# Patient Record
Sex: FEMALE | Race: WHITE | NOT HISPANIC OR LATINO | Employment: OTHER | URBAN - METROPOLITAN AREA
[De-identification: names, ages, dates, MRNs, and addresses within clinical notes are randomized per-mention and may not be internally consistent; named-entity substitution may affect disease eponyms.]

---

## 2024-01-15 ENCOUNTER — NURSING HOME VISIT (OUTPATIENT)
Dept: POST ACUTE CARE | Facility: EXTERNAL LOCATION | Age: 84
End: 2024-01-15
Payer: MEDICARE

## 2024-01-15 VITALS
HEART RATE: 70 BPM | RESPIRATION RATE: 16 BRPM | TEMPERATURE: 98.9 F | WEIGHT: 126.3 LBS | DIASTOLIC BLOOD PRESSURE: 68 MMHG | SYSTOLIC BLOOD PRESSURE: 122 MMHG | OXYGEN SATURATION: 98 %

## 2024-01-15 DIAGNOSIS — S22.089D CLOSED FRACTURE OF ELEVENTH THORACIC VERTEBRA WITH ROUTINE HEALING, UNSPECIFIED FRACTURE MORPHOLOGY, SUBSEQUENT ENCOUNTER: Primary | ICD-10-CM

## 2024-01-15 DIAGNOSIS — K21.9 GASTROESOPHAGEAL REFLUX DISEASE WITHOUT ESOPHAGITIS: ICD-10-CM

## 2024-01-15 DIAGNOSIS — E78.5 HYPERLIPIDEMIA, UNSPECIFIED HYPERLIPIDEMIA TYPE: ICD-10-CM

## 2024-01-15 DIAGNOSIS — F41.9 ANXIETY: ICD-10-CM

## 2024-01-15 DIAGNOSIS — N30.00 ACUTE CYSTITIS WITHOUT HEMATURIA: ICD-10-CM

## 2024-01-15 DIAGNOSIS — F01.54 VASCULAR DEMENTIA WITH ANXIETY, UNSPECIFIED DEMENTIA SEVERITY (MULTI): ICD-10-CM

## 2024-01-15 DIAGNOSIS — I10 HYPERTENSION, UNSPECIFIED TYPE: ICD-10-CM

## 2024-01-15 DIAGNOSIS — R11.0 NAUSEA: ICD-10-CM

## 2024-01-15 DIAGNOSIS — Z91.81 HX OF FALL: ICD-10-CM

## 2024-01-15 DIAGNOSIS — E55.9 VITAMIN D DEFICIENCY: ICD-10-CM

## 2024-01-15 DIAGNOSIS — Z85.3 HX: BREAST CANCER: ICD-10-CM

## 2024-01-15 PROBLEM — F01.50 VASCULAR DEMENTIA (MULTI): Status: ACTIVE | Noted: 2024-01-15

## 2024-01-15 PROBLEM — S22.089A: Status: ACTIVE | Noted: 2024-01-15

## 2024-01-15 PROBLEM — Z87.898 HX OF SYNCOPE: Status: ACTIVE | Noted: 2024-01-15

## 2024-01-15 PROBLEM — N39.0 UTI (URINARY TRACT INFECTION): Status: ACTIVE | Noted: 2024-01-15

## 2024-01-15 PROCEDURE — G0317 PROLONG NURSING FAC EVAL 15M: HCPCS | Performed by: NURSE PRACTITIONER

## 2024-01-15 PROCEDURE — 99310 SBSQ NF CARE HIGH MDM 45: CPT | Performed by: NURSE PRACTITIONER

## 2024-01-15 ASSESSMENT — ENCOUNTER SYMPTOMS
CHILLS: 0
PALPITATIONS: 1
DIARRHEA: 0
FATIGUE: 1
FEVER: 0
WEAKNESS: 0
COUGH: 0
DYSURIA: 0
SHORTNESS OF BREATH: 0
CONSTIPATION: 0
SORE THROAT: 0
DIZZINESS: 0
RHINORRHEA: 0
WOUND: 0
DIFFICULTY URINATING: 0

## 2024-01-15 NOTE — PROGRESS NOTES
MRN:[id number]     Subjective   Patient ID: Yasemin Sweet is a 83 y.o. female who presents for Initial SNF visit.  Transfer records reviewed.   83 year old female admitted to [facility] on 1-11-24 from [facility] in [place] at family's request.  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    Hx T11-12 compression fx, syncope, vascular dementia with delusions, HTN, GERD, anxiety, hypokalemia, HLD, falls.  Had Covid on 12-22-23.    See ROS.  DNRCC        Review of Systems   Constitutional:  Positive for fatigue. Negative for chills and fever.   HENT:  Negative for rhinorrhea and sore throat.    Respiratory:  Negative for cough and shortness of breath.    Cardiovascular:  Positive for palpitations (occasional). Negative for chest pain.   Gastrointestinal:  Negative for constipation and diarrhea.        Reports fair appetite.  States she was nauseated last evening, but it has resolved.    Endocrine:        Denies hx of DM   Genitourinary:  Negative for difficulty urinating and dysuria.   Musculoskeletal:         Denies pain anywhere   Skin:  Negative for rash and wound.   Neurological:  Negative for dizziness and weakness.   Hematological:         Reports hx of left breast Ca 5 years ago with partial mastectomy.  Declined radiation and chemo.   Psychiatric/Behavioral:          Admits to being anxious.  Denies depression.       Objective     /68   Pulse 70   Temp 37.2 °C (98.9 °F)   Resp 16   Wt 57.3 kg (126 lb 4.8 oz)   SpO2 98%    Physical Exam  Constitutional:       General: She is not in acute distress.     Appearance: She is not toxic-appearing.      Comments: WD, WN, NAD.  Appears noted age.   HENT:      Head: Normocephalic and atraumatic.      Mouth/Throat:      Mouth: Mucous membranes are moist.      Pharynx: Oropharynx is clear.   Eyes:      Conjunctiva/sclera: Conjunctivae normal.   Neck:       "Vascular: No carotid bruit.      Comments: No JVD  Cardiovascular:      Rate and Rhythm: Normal rate and regular rhythm.      Pulses: Normal pulses.      Heart sounds: Normal heart sounds.   Pulmonary:      Effort: Pulmonary effort is normal.      Breath sounds: Normal breath sounds.   Abdominal:      General: Bowel sounds are normal. There is no distension.      Palpations: Abdomen is soft.      Tenderness: There is no abdominal tenderness. There is no guarding.   Musculoskeletal:      Right lower leg: No edema.      Left lower leg: No edema.   Skin:     General: Skin is warm and dry.      Comments: No PVD changes distal shins   Neurological:      Mental Status: She is alert.      Comments: Alert and oriented to self and year.  Thought it was February.  Had no guess for place.  Speech clear and conversant, but with occasional confusion.  Greeted provider with, \"I'm thinking of going to the mall tonight\".      No prior lab work available    Assessment/Plan   Problem List Items Addressed This Visit             ICD-10-CM    Closed fracture of T11 vertebra (CMS/HCC) - Primary S22.089A     Details unknown.  Continue routine Tylenol 1 gm q8h, 4% Lidocaine patch.  Will discontinue prn Tylenol; already at max daily dose.  PT/OT to maximize safety, strength, and function.         Vascular dementia (CMS/HCC) F01.50     Again, details unknown.  Hx CVA?         HTN (hypertension) I10     Controlled with Lopressor; continue.           Hx of fall Z91.81     PT/OT         UTI (urinary tract infection) N39.0     Continue Augmentin through 1-17-24.           Nausea R11.0     Nursing reports, but resident denies.  May be having nausea r/t Augmentin.  Will add prn Zofran.          Anxiety F41.9     Continue prn Vistaril, but will also add Lexapro in attempt to wean off prn Vistaril.          HLD (hyperlipidemia) E78.5     On no lipid controlling meds, and no lab work available.  Will check FLP this week.           GERD " (gastroesophageal reflux disease) K21.9     Continue prn calcium carbonate         Vitamin D deficiency E55.9     Will check vit D and calcium levels this week         HX: breast cancer Z85.3     Will check CBC, CMP, TSH, free T4, FLP, vit D levels in two days for baseline.    No Epic records; entered as a new patient with completely blank EMR.  Time Spent  Prep time on day of patient encounter: 10 minutes  Time spent directly with patient, family or caregiver: 15 minutes  Documentation Time: 75 minutes    Time spent [id number]      Diagnoses and all orders for this visit:  Closed fracture of eleventh thoracic vertebra with routine healing, unspecified fracture morphology, subsequent encounter  Vascular dementia with anxiety, unspecified dementia severity (CMS/HCC)  Hypertension, unspecified type  Hx of fall  Acute cystitis without hematuria  Nausea  Anxiety  Hyperlipidemia, unspecified hyperlipidemia type  Gastroesophageal reflux disease without esophagitis  Vitamin D deficiency  HX: breast cancer

## 2024-01-15 NOTE — ASSESSMENT & PLAN NOTE
Details unknown.  Continue routine Tylenol 1 gm q8h, 4% Lidocaine patch.  Will discontinue prn Tylenol; already at max daily dose.  PT/OT to maximize safety, strength, and function.

## 2024-01-15 NOTE — LETTER
Patient: Yasemin Sweet  : 1940    Encounter Date: 01/15/2024    MRN:[id number]     Subjective  Patient ID: Yasemin Sweet is a 83 y.o. female who presents for Initial SNF visit.  Transfer records reviewed.   83 year old female admitted to [facility] on 24 from [facility] in [place] at family's request.  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    Hx T11-12 compression fx, syncope, vascular dementia with delusions, HTN, GERD, anxiety, hypokalemia, HLD, falls.  Had Covid on 23.    See ROS.  DNRCC        Review of Systems   Constitutional:  Positive for fatigue. Negative for chills and fever.   HENT:  Negative for rhinorrhea and sore throat.    Respiratory:  Negative for cough and shortness of breath.    Cardiovascular:  Positive for palpitations (occasional). Negative for chest pain.   Gastrointestinal:  Negative for constipation and diarrhea.        Reports fair appetite.  States she was nauseated last evening, but it has resolved.    Endocrine:        Denies hx of DM   Genitourinary:  Negative for difficulty urinating and dysuria.   Musculoskeletal:         Denies pain anywhere   Skin:  Negative for rash and wound.   Neurological:  Negative for dizziness and weakness.   Hematological:         Reports hx of left breast Ca 5 years ago with partial mastectomy.  Declined radiation and chemo.   Psychiatric/Behavioral:          Admits to being anxious.  Denies depression.       Objective    /68   Pulse 70   Temp 37.2 °C (98.9 °F)   Resp 16   Wt 57.3 kg (126 lb 4.8 oz)   SpO2 98%    Physical Exam  Constitutional:       General: She is not in acute distress.     Appearance: She is not toxic-appearing.      Comments: WD, WN, NAD.  Appears noted age.   HENT:      Head: Normocephalic and atraumatic.      Mouth/Throat:      Mouth: Mucous membranes are moist.      Pharynx: Oropharynx is clear.  "  Eyes:      Conjunctiva/sclera: Conjunctivae normal.   Neck:      Vascular: No carotid bruit.      Comments: No JVD  Cardiovascular:      Rate and Rhythm: Normal rate and regular rhythm.      Pulses: Normal pulses.      Heart sounds: Normal heart sounds.   Pulmonary:      Effort: Pulmonary effort is normal.      Breath sounds: Normal breath sounds.   Abdominal:      General: Bowel sounds are normal. There is no distension.      Palpations: Abdomen is soft.      Tenderness: There is no abdominal tenderness. There is no guarding.   Musculoskeletal:      Right lower leg: No edema.      Left lower leg: No edema.   Skin:     General: Skin is warm and dry.      Comments: No PVD changes distal shins   Neurological:      Mental Status: She is alert.      Comments: Alert and oriented to self and year.  Thought it was February.  Had no guess for place.  Speech clear and conversant, but with occasional confusion.  Greeted provider with, \"I'm thinking of going to the mall tonight\".      No prior lab work available    Assessment/Plan  Problem List Items Addressed This Visit             ICD-10-CM    Closed fracture of T11 vertebra (CMS/HCC) - Primary S22.089A     Details unknown.  Continue routine Tylenol 1 gm q8h, 4% Lidocaine patch.  Will discontinue prn Tylenol; already at max daily dose.  PT/OT to maximize safety, strength, and function.         Vascular dementia (CMS/HCC) F01.50     Again, details unknown.  Hx CVA?         HTN (hypertension) I10     Controlled with Lopressor; continue.           Hx of fall Z91.81     PT/OT         UTI (urinary tract infection) N39.0     Continue Augmentin through 1-17-24.           Nausea R11.0     Nursing reports, but resident denies.  May be having nausea r/t Augmentin.  Will add prn Zofran.          Anxiety F41.9     Continue prn Vistaril, but will also add Lexapro in attempt to wean off prn Vistaril.          HLD (hyperlipidemia) E78.5     On no lipid controlling meds, and no lab work " available.  Will check FLP this week.           GERD (gastroesophageal reflux disease) K21.9     Continue prn calcium carbonate         Vitamin D deficiency E55.9     Will check vit D and calcium levels this week         HX: breast cancer Z85.3     Will check CBC, CMP, TSH, free T4, FLP, vit D levels in two days for baseline.    No Epic records; entered as a new patient with completely blank EMR.  Time Spent  Prep time on day of patient encounter: 10 minutes  Time spent directly with patient, family or caregiver: 15 minutes  Documentation Time: 75 minutes    Time spent [id number]      Diagnoses and all orders for this visit:  Closed fracture of eleventh thoracic vertebra with routine healing, unspecified fracture morphology, subsequent encounter  Vascular dementia with anxiety, unspecified dementia severity (CMS/HCC)  Hypertension, unspecified type  Hx of fall  Acute cystitis without hematuria  Nausea  Anxiety  Hyperlipidemia, unspecified hyperlipidemia type  Gastroesophageal reflux disease without esophagitis  Vitamin D deficiency  HX: breast cancer    Electronically Signed By: MEERA Nguyen, YAIR   1/15/24 11:10 AM

## 2024-01-25 ENCOUNTER — NURSING HOME VISIT (OUTPATIENT)
Dept: POST ACUTE CARE | Facility: EXTERNAL LOCATION | Age: 84
End: 2024-01-25
Payer: MEDICARE

## 2024-01-25 DIAGNOSIS — F01.54 VASCULAR DEMENTIA WITH ANXIETY, UNSPECIFIED DEMENTIA SEVERITY (MULTI): ICD-10-CM

## 2024-01-25 DIAGNOSIS — F41.9 ANXIETY: ICD-10-CM

## 2024-01-25 DIAGNOSIS — Z91.81 HX OF FALL: Primary | ICD-10-CM

## 2024-01-25 PROCEDURE — 99305 1ST NF CARE MODERATE MDM 35: CPT | Performed by: STUDENT IN AN ORGANIZED HEALTH CARE EDUCATION/TRAINING PROGRAM

## 2024-01-25 NOTE — LETTER
Patient: Yaseimn Sweet  : 1940    Encounter Date: 2024    Patient seen at [facility].    Subjective:    Patient is admitted to [facility] following hospitalization for fracture of vertebrae.    She has vascular dementia and has been a bit confused, complaining of anxiety requiring hydroxyzine frequently.    Patient has no new complaints at this time. Patient is participating in therapy sessions.    ROS:    12-pt ROS was reviewed with patient and was negative, unless otherwise noted in HPI.    Objective:     PMHx; SocHx; FamHx; Allergies; Medications: all reviewed, see CNP/PA notes, EMR, and records for further details.    LABS: available labs were reviewed    VITALS: vitals reviewed, see EMR for further details    PHYSICAL EXAM:  GEN: calm, no acute distress  HEENT: PER, EOMI, MMM  NECK: supple  CV: S1, S2, RRR  PULM: unlabored breathing, CTAB  ABD: soft, NT  Neuro: no new gross focal deficits  PSYCH: appropriate affect    ASSESSMENT:    Vascular dementia  Amb dysfunction  Anxiety    PLAN:    Would ideally like to limit anticholingergics, consider low-dose haloperidol or other antipsychotics of requiring frequent dosing of hydroxyzine.    Not wearing TSLO brace, uncooperative.    Otherwise, no changes.    PT/OT/ST as indicated.    Prior records and hospital notes reviewed.    I agree with plan of care as detailed in CNP/PA note.    Fall precautions.    I discussed case with nursing staff.    Advised close PCP fu as outpatient as indicated.    Abdirashid Mares DO    Trainee role: Resident    I saw and evaluated the patient. I personally obtained the key and critical portions of the history and physical exam or was physically present for key and critical portions performed by the trainee. I reviewed the trainee's documentation and discussed the patient with the trainee. I agree with the trainee's medical decision making as documented on the trainee's notes.    Jaime Combs,  M.D.        Electronically Signed By: Jaime Combs MD   1/30/24  7:35 PM

## 2024-01-25 NOTE — PROGRESS NOTES
Patient seen at [facility].    Subjective:    Patient is admitted to [facility] following hospitalization for fracture of vertebrae.    She has vascular dementia and has been a bit confused, complaining of anxiety requiring hydroxyzine frequently.    Patient has no new complaints at this time. Patient is participating in therapy sessions.    ROS:    12-pt ROS was reviewed with patient and was negative, unless otherwise noted in HPI.    Objective:     PMHx; SocHx; FamHx; Allergies; Medications: all reviewed, see CNP/PA notes, EMR, and records for further details.    LABS: available labs were reviewed    VITALS: vitals reviewed, see EMR for further details    PHYSICAL EXAM:  GEN: calm, no acute distress  HEENT: PER, EOMI, MMM  NECK: supple  CV: S1, S2, RRR  PULM: unlabored breathing, CTAB  ABD: soft, NT  Neuro: no new gross focal deficits  PSYCH: appropriate affect    ASSESSMENT:    Vascular dementia  Amb dysfunction  Anxiety    PLAN:    Would ideally like to limit anticholingergics, consider low-dose haloperidol or other antipsychotics of requiring frequent dosing of hydroxyzine.    Not wearing TSLO brace, uncooperative.    Otherwise, no changes.    PT/OT/ST as indicated.    Prior records and hospital notes reviewed.    I agree with plan of care as detailed in CNP/PA note.    Fall precautions.    I discussed case with nursing staff.    Advised close PCP fu as outpatient as indicated.    Abdirashid Mares DO    Trainee role: Resident    I saw and evaluated the patient. I personally obtained the key and critical portions of the history and physical exam or was physically present for key and critical portions performed by the trainee. I reviewed the trainee's documentation and discussed the patient with the trainee. I agree with the trainee's medical decision making as documented on the trainee's notes.    Jaime Combs M.D.

## 2024-01-26 ENCOUNTER — NURSING HOME VISIT (OUTPATIENT)
Dept: POST ACUTE CARE | Facility: EXTERNAL LOCATION | Age: 84
End: 2024-01-26
Payer: MEDICARE

## 2024-01-26 VITALS
TEMPERATURE: 97.4 F | RESPIRATION RATE: 18 BRPM | SYSTOLIC BLOOD PRESSURE: 125 MMHG | DIASTOLIC BLOOD PRESSURE: 56 MMHG | OXYGEN SATURATION: 99 % | WEIGHT: 124.8 LBS | HEART RATE: 74 BPM

## 2024-01-26 DIAGNOSIS — F01.54 VASCULAR DEMENTIA WITH ANXIETY, UNSPECIFIED DEMENTIA SEVERITY (MULTI): ICD-10-CM

## 2024-01-26 DIAGNOSIS — S22.089D CLOSED FRACTURE OF ELEVENTH THORACIC VERTEBRA WITH ROUTINE HEALING, UNSPECIFIED FRACTURE MORPHOLOGY, SUBSEQUENT ENCOUNTER: Primary | ICD-10-CM

## 2024-01-26 PROCEDURE — 99309 SBSQ NF CARE MODERATE MDM 30: CPT | Performed by: NURSE PRACTITIONER

## 2024-01-26 ASSESSMENT — ENCOUNTER SYMPTOMS
FEVER: 0
DIZZINESS: 1
COUGH: 0
WEAKNESS: 1
CHILLS: 0
FATIGUE: 1
SHORTNESS OF BREATH: 0

## 2024-01-26 NOTE — ASSESSMENT & PLAN NOTE
Will need to establish herself with a local orthopod for follow up.  Will have nursing assist.  Continue routine Tylenol and Lidocaine patch for pain.

## 2024-01-26 NOTE — LETTER
"Patient: Yasemin Sweet  : 1940    Encounter Date: 2024    MRN:[id number]     Subjective  Patient ID: Yasemin Sweet is a 83 y.o. female who presents for follow up T11 + T12 fxs.  83 year old female admitted to [facility] on 24 from [facility] in [place] at family's request.  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    Hx T11-12 compression fx, syncope, vascular dementia with delusions, HTN, GERD, anxiety, hypokalemia, HLD, falls.  Had Covid on 23.    Nursing reports that resident wanders, and they have found her in other residents' rooms.  See ROS.  DNRCC        Review of Systems   Constitutional:  Positive for fatigue. Negative for chills and fever.   Respiratory:  Negative for cough and shortness of breath.    Gastrointestinal:         Reports fair appetite.  Also reports both constipation and diarrhea.   Musculoskeletal:         States her back hurts \"a bit\".   Neurological:  Positive for dizziness (chronic) and weakness.       Objective    /56   Pulse 74   Temp 36.3 °C (97.4 °F)   Resp 18   Wt 56.6 kg (124 lb 12.8 oz)   SpO2 99%    Physical Exam  Constitutional:       General: She is not in acute distress.     Appearance: She is not toxic-appearing.      Comments: WD, WN, NAD.  Sitting in chair in her room before breakfast.  Not wearing TLSO brace.   HENT:      Head: Normocephalic and atraumatic.      Mouth/Throat:      Mouth: Mucous membranes are moist.      Pharynx: Oropharynx is clear.   Eyes:      Conjunctiva/sclera: Conjunctivae normal.   Cardiovascular:      Rate and Rhythm: Normal rate and regular rhythm.      Pulses: Normal pulses.      Heart sounds: Normal heart sounds.   Pulmonary:      Effort: Pulmonary effort is normal.      Breath sounds: Normal breath sounds.   Abdominal:      General: Bowel sounds are normal.      Palpations: Abdomen is soft. "   Musculoskeletal:      Right lower leg: No edema.      Left lower leg: No edema.   Skin:     General: Skin is warm and dry.   Neurological:      Mental Status: She is alert. Mental status is at baseline.      Comments: Speech clear and conversant.  Followed commands.  No abnormal movements.       Labs from 1-17-24 reviewed again today.     Assessment/Plan  Problem List Items Addressed This Visit             ICD-10-CM    Closed fracture of T11 vertebra (CMS/[facility]) - Primary S22.089A     Will need to establish herself with a local orthopod for follow up.  Will have nursing assist.  Continue routine Tylenol and Lidocaine patch for pain.         Vascular dementia (CMS/[facility]) F01.50     Continue prn Vistaril for associated anxiety.  Nursing to monitor safety.                   Diagnoses and all orders for this visit:  Closed fracture of eleventh thoracic vertebra with routine healing, unspecified fracture morphology, subsequent encounter  Vascular dementia with anxiety, unspecified dementia severity (CMS/[facility])      Electronically Signed By: FEDERICO Nguyen-CNP, DNP   1/26/24 12:54 PM

## 2024-01-26 NOTE — PROGRESS NOTES
"MRN:[id number]     Subjective   Patient ID: Yasemin Sweet is a 83 y.o. female who presents for follow up T11 + T12 fxs.  83 year old female admitted to [facility] on 1-11-24 from [facility] in [place] at family's request.  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    Hx T11-12 compression fx, syncope, vascular dementia with delusions, HTN, GERD, anxiety, hypokalemia, HLD, falls.  Had Covid on 12-22-23.    Nursing reports that resident wanders, and they have found her in other residents' rooms.  See ROS.  DNC        Review of Systems   Constitutional:  Positive for fatigue. Negative for chills and fever.   Respiratory:  Negative for cough and shortness of breath.    Gastrointestinal:         Reports fair appetite.  Also reports both constipation and diarrhea.   Musculoskeletal:         States her back hurts \"a bit\".   Neurological:  Positive for dizziness (chronic) and weakness.       Objective     /56   Pulse 74   Temp 36.3 °C (97.4 °F)   Resp 18   Wt 56.6 kg (124 lb 12.8 oz)   SpO2 99%    Physical Exam  Constitutional:       General: She is not in acute distress.     Appearance: She is not toxic-appearing.      Comments: WD, WN, NAD.  Sitting in chair in her room before breakfast.  Not wearing TLSO brace.   HENT:      Head: Normocephalic and atraumatic.      Mouth/Throat:      Mouth: Mucous membranes are moist.      Pharynx: Oropharynx is clear.   Eyes:      Conjunctiva/sclera: Conjunctivae normal.   Cardiovascular:      Rate and Rhythm: Normal rate and regular rhythm.      Pulses: Normal pulses.      Heart sounds: Normal heart sounds.   Pulmonary:      Effort: Pulmonary effort is normal.      Breath sounds: Normal breath sounds.   Abdominal:      General: Bowel sounds are normal.      Palpations: Abdomen is soft.   Musculoskeletal:      Right lower leg: No edema.      Left lower leg: No edema. "   Skin:     General: Skin is warm and dry.   Neurological:      Mental Status: She is alert. Mental status is at baseline.      Comments: Speech clear and conversant.  Followed commands.  No abnormal movements.       Labs from 1-17-24 reviewed again today.     Assessment/Plan   Problem List Items Addressed This Visit             ICD-10-CM    Closed fracture of T11 vertebra (CMS/[facility]) - Primary S22.089A     Will need to establish herself with a local orthopod for follow up.  Will have nursing assist.  Continue routine Tylenol and Lidocaine patch for pain.         Vascular dementia (CMS/[facility]) F01.50     Continue prn Vistaril for associated anxiety.  Nursing to monitor safety.                   Diagnoses and all orders for this visit:  Closed fracture of eleventh thoracic vertebra with routine healing, unspecified fracture morphology, subsequent encounter  Vascular dementia with anxiety, unspecified dementia severity (CMS/[facility])

## 2024-02-06 ENCOUNTER — NURSING HOME VISIT (OUTPATIENT)
Dept: POST ACUTE CARE | Facility: EXTERNAL LOCATION | Age: 84
End: 2024-02-06
Payer: MEDICARE

## 2024-02-06 VITALS
WEIGHT: 128.4 LBS | DIASTOLIC BLOOD PRESSURE: 74 MMHG | HEART RATE: 76 BPM | TEMPERATURE: 97.2 F | SYSTOLIC BLOOD PRESSURE: 110 MMHG | OXYGEN SATURATION: 97 % | RESPIRATION RATE: 16 BRPM

## 2024-02-06 DIAGNOSIS — F01.54 VASCULAR DEMENTIA WITH ANXIETY, UNSPECIFIED DEMENTIA SEVERITY (MULTI): ICD-10-CM

## 2024-02-06 DIAGNOSIS — S22.089D CLOSED FRACTURE OF ELEVENTH THORACIC VERTEBRA WITH ROUTINE HEALING, UNSPECIFIED FRACTURE MORPHOLOGY, SUBSEQUENT ENCOUNTER: ICD-10-CM

## 2024-02-06 DIAGNOSIS — R19.7 DIARRHEA, UNSPECIFIED TYPE: Primary | ICD-10-CM

## 2024-02-06 DIAGNOSIS — F41.9 ANXIETY: ICD-10-CM

## 2024-02-06 DIAGNOSIS — I10 HYPERTENSION, UNSPECIFIED TYPE: ICD-10-CM

## 2024-02-06 PROCEDURE — 99316 NF DSCHRG MGMT 30 MIN+: CPT | Performed by: NURSE PRACTITIONER

## 2024-02-06 ASSESSMENT — ENCOUNTER SYMPTOMS
SHORTNESS OF BREATH: 0
PALPITATIONS: 0
FEVER: 0
CHILLS: 0
BACK PAIN: 1
FATIGUE: 0
COUGH: 0
DIZZINESS: 0
WEAKNESS: 1

## 2024-02-06 NOTE — ASSESSMENT & PLAN NOTE
Labs with mild hypernatremia and elevated BUN/Cr ratio.  Not taking any routine stool softeners or laxatives.  Will have nursing send a stool for c diff, then start prn Imodium.

## 2024-02-06 NOTE — LETTER
Patient: Yasemin Sweet  : 1940    Encounter Date: 2024    MRN:[id number]     Subjective  Patient ID: Yasemin Sweet is a 83 y.o. female who presents for SNF discharge.  83 year old female admitted to [facility] on 24 from [facility] in [place] at family's request.  Hx T11-12 compression fx, syncope, vascular dementia with delusions, HTN, GERD, anxiety, hypokalemia, HLD, falls.  Had Covid on 23.    Scheduled for discharge to [facility] on 24.    See ROS.  DNRCC        Review of Systems   Constitutional:  Negative for chills, fatigue and fever.   Respiratory:  Negative for cough and shortness of breath.    Cardiovascular:  Negative for chest pain and palpitations.   Gastrointestinal:         Reports fair appetite.  Resident's therapist reports that she has been having multiple episodes of diarrhea daily for about a week.  STNA reported that she had formed stool this am.   Musculoskeletal:  Positive for back pain.   Neurological:  Positive for weakness. Negative for dizziness.       Objective    /74   Pulse 76   Temp 36.2 °C (97.2 °F)   Resp 16   Wt 58.2 kg (128 lb 6.4 oz)   SpO2 97%    Physical Exam  Constitutional:       General: She is not in acute distress.     Appearance: She is not toxic-appearing.      Comments: WD, WN, NAD.  Sitting in wheelchair in her room.   HENT:      Head: Normocephalic and atraumatic.      Mouth/Throat:      Mouth: Mucous membranes are moist.      Pharynx: Oropharynx is clear.   Eyes:      Conjunctiva/sclera: Conjunctivae normal.   Cardiovascular:      Rate and Rhythm: Normal rate and regular rhythm.      Pulses: Normal pulses.      Heart sounds: Normal heart sounds.   Pulmonary:      Effort: Pulmonary effort is normal.      Breath sounds: Normal breath sounds.   Abdominal:      General: Bowel sounds are normal. There is no distension.      Palpations: Abdomen is soft.      Tenderness:  There is no abdominal tenderness. There is no guarding.   Musculoskeletal:      Right lower leg: No edema.      Left lower leg: No edema.   Skin:     General: Skin is warm and dry.   Neurological:      Mental Status: She is alert. Mental status is at baseline.     Labs from 2-5-24 reviewed today.    Assessment/Plan  Problem List Items Addressed This Visit             ICD-10-CM    Closed fracture of T11 vertebra (CMS/[facility]) S22.089A     Continue routine Tylenol with prn Lidocaine 4% patches.         Vascular dementia (CMS/[facility]) F01.50    HTN (hypertension) I10     Controlled with Lopressor; continue current dose.         Anxiety F41.9     Continue Lexapro with prn Vistaril         Diarrhea - Primary R19.7     Labs with mild hypernatremia and elevated BUN/Cr ratio.  Not taking any routine stool softeners or laxatives.  Will have nursing send a stool for c diff, then start prn Imodium.          Medically stable for discharge to [facility] on 2-9-24, pending c diff results.    Time Spent  Prep time on day of patient encounter: 5 minutes  Time spent directly with patient, family or caregiver: 10 minutes  Documentation Time: 20 minutes    Time spent:  3578-5413      Diagnoses and all orders for this visit:  Diarrhea, unspecified type  Closed fracture of eleventh thoracic vertebra with routine healing, unspecified fracture morphology, subsequent encounter  Vascular dementia with anxiety, unspecified dementia severity (CMS/[facility])  Hypertension, unspecified type  Anxiety    Electronically Signed By: MEERA Nguyen, YAIR   2/6/24  3:35 PM

## 2024-02-06 NOTE — PROGRESS NOTES
MRN:[id number]     Subjective   Patient ID: Yasemin Sweet is a 83 y.o. female who presents for SNF discharge.  83 year old female admitted to [facility] on 1-11-24 from [facility] in [place] at family's request.  Hx T11-12 compression fx, syncope, vascular dementia with delusions, HTN, GERD, anxiety, hypokalemia, HLD, falls.  Had Covid on 12-22-23.    Scheduled for discharge to [facility] on 2-9-24.    See ROS.  DNRCC        Review of Systems   Constitutional:  Negative for chills, fatigue and fever.   Respiratory:  Negative for cough and shortness of breath.    Cardiovascular:  Negative for chest pain and palpitations.   Gastrointestinal:         Reports fair appetite.  Resident's therapist reports that she has been having multiple episodes of diarrhea daily for about a week.  STNA reported that she had formed stool this am.   Musculoskeletal:  Positive for back pain.   Neurological:  Positive for weakness. Negative for dizziness.       Objective     /74   Pulse 76   Temp 36.2 °C (97.2 °F)   Resp 16   Wt 58.2 kg (128 lb 6.4 oz)   SpO2 97%    Physical Exam  Constitutional:       General: She is not in acute distress.     Appearance: She is not toxic-appearing.      Comments: WD, WN, NAD.  Sitting in wheelchair in her room.   HENT:      Head: Normocephalic and atraumatic.      Mouth/Throat:      Mouth: Mucous membranes are moist.      Pharynx: Oropharynx is clear.   Eyes:      Conjunctiva/sclera: Conjunctivae normal.   Cardiovascular:      Rate and Rhythm: Normal rate and regular rhythm.      Pulses: Normal pulses.      Heart sounds: Normal heart sounds.   Pulmonary:      Effort: Pulmonary effort is normal.      Breath sounds: Normal breath sounds.   Abdominal:      General: Bowel sounds are normal. There is no distension.      Palpations: Abdomen is soft.      Tenderness: There is no abdominal tenderness. There is no guarding.    Musculoskeletal:      Right lower leg: No edema.      Left lower leg: No edema.   Skin:     General: Skin is warm and dry.   Neurological:      Mental Status: She is alert. Mental status is at baseline.     Labs from 2-5-24 reviewed today.    Assessment/Plan   Problem List Items Addressed This Visit             ICD-10-CM    Closed fracture of T11 vertebra (CMS/[facility]) S22.089A     Continue routine Tylenol with prn Lidocaine 4% patches.         Vascular dementia (CMS/[facility]) F01.50    HTN (hypertension) I10     Controlled with Lopressor; continue current dose.         Anxiety F41.9     Continue Lexapro with prn Vistaril         Diarrhea - Primary R19.7     Labs with mild hypernatremia and elevated BUN/Cr ratio.  Not taking any routine stool softeners or laxatives.  Will have nursing send a stool for c diff, then start prn Imodium.          Medically stable for discharge to [facility] on 2-9-24, pending c diff results.    Time Spent  Prep time on day of patient encounter: 5 minutes  Time spent directly with patient, family or caregiver: 10 minutes  Documentation Time: 20 minutes    Time spent:  2723-8854      Diagnoses and all orders for this visit:  Diarrhea, unspecified type  Closed fracture of eleventh thoracic vertebra with routine healing, unspecified fracture morphology, subsequent encounter  Vascular dementia with anxiety, unspecified dementia severity (CMS/[facility])  Hypertension, unspecified type  Anxiety

## 2024-02-08 ENCOUNTER — NURSING HOME VISIT (OUTPATIENT)
Dept: POST ACUTE CARE | Facility: EXTERNAL LOCATION | Age: 84
End: 2024-02-08
Payer: MEDICARE

## 2024-02-08 DIAGNOSIS — R19.7 DIARRHEA, UNSPECIFIED TYPE: ICD-10-CM

## 2024-02-08 DIAGNOSIS — F01.54 VASCULAR DEMENTIA WITH ANXIETY, UNSPECIFIED DEMENTIA SEVERITY (MULTI): Primary | ICD-10-CM

## 2024-02-08 DIAGNOSIS — F41.9 ANXIETY: ICD-10-CM

## 2024-02-08 DIAGNOSIS — S22.089D CLOSED FRACTURE OF ELEVENTH THORACIC VERTEBRA WITH ROUTINE HEALING, UNSPECIFIED FRACTURE MORPHOLOGY, SUBSEQUENT ENCOUNTER: ICD-10-CM

## 2024-02-08 DIAGNOSIS — Z87.898 HX OF SYNCOPE: ICD-10-CM

## 2024-02-08 PROCEDURE — 99308 SBSQ NF CARE LOW MDM 20: CPT | Performed by: STUDENT IN AN ORGANIZED HEALTH CARE EDUCATION/TRAINING PROGRAM

## 2024-02-08 NOTE — LETTER
Patient: Yasemin Sweet  : 1940    Encounter Date: 2024    Patient seen at [facility] for followup.    Subjective:    Patient is admitted to [facility] following hospitalization for fracture of vertebrae.    She has vascular dementia and is difficult to communicate with however says she feels fine.    Apparently having some diarrhea, unclear quantity or quality. Cdiff sent.    Patient has no new complaints at this time. Patient is participating in therapy sessions.    ROS:    Difficult to obtain ROS.    Objective:     PMHx; SocHx; FamHx; Allergies; Medications: all reviewed, see CNP/PA notes, EMR, and records for further details.    LABS: available labs were reviewed    VITALS: vitals reviewed, see EMR for further details    PHYSICAL EXAM:  GEN: calm, no acute distress  HEENT: PER, EOMI, MMM  NECK: supple  CV: S1, S2, RRR  PULM: unlabored breathing, CTAB  ABD: soft, NT  Neuro: no new gross focal deficits  PSYCH: appropriate affect    ASSESSMENT:    Vascular dementia  Diarrhea  Amb dysfunction  Anxiety    PLAN:    Limit anticholingergics, adhere strongly to beer's list.    Diarrhea unlikely Cdiff however appropriate to rule out prior to dc.    DC to memory unit hopefully soon.    Otherwise, no changes.    PT/OT/ST as indicated.    Prior records and hospital notes reviewed.    I agree with plan of care as detailed in CNP/PA note.    Fall precautions.    I discussed case with nursing staff.    Advised close PCP fu as outpatient as indicated.    Abdirashid Mares DO    Trainee role: Resident    I saw and evaluated the patient. I personally obtained the key and critical portions of the history and physical exam or was physically present for key and critical portions performed by the trainee. I reviewed the trainee's documentation and discussed the patient with the trainee. I agree with the trainee's medical decision making as documented on the trainee's notes.    Jaime Combs,  M.D.        Electronically Signed By: Jaime Combs MD   2/14/24  1:42 PM

## 2024-02-10 NOTE — PROGRESS NOTES
Patient seen at [facility] for followup.    Subjective:    Patient is admitted to [facility] following hospitalization for fracture of vertebrae.    She has vascular dementia and is difficult to communicate with however says she feels fine.    Apparently having some diarrhea, unclear quantity or quality. Cdiff sent.    Patient has no new complaints at this time. Patient is participating in therapy sessions.    ROS:    Difficult to obtain ROS.    Objective:     PMHx; SocHx; FamHx; Allergies; Medications: all reviewed, see CNP/PA notes, EMR, and records for further details.    LABS: available labs were reviewed    VITALS: vitals reviewed, see EMR for further details    PHYSICAL EXAM:  GEN: calm, no acute distress  HEENT: PER, EOMI, MMM  NECK: supple  CV: S1, S2, RRR  PULM: unlabored breathing, CTAB  ABD: soft, NT  Neuro: no new gross focal deficits  PSYCH: appropriate affect    ASSESSMENT:    Vascular dementia  Diarrhea  Amb dysfunction  Anxiety    PLAN:    Limit anticholingergics, adhere strongly to beer's list.    Diarrhea unlikely Cdiff however appropriate to rule out prior to dc.    DC to memory unit hopefully soon.    Otherwise, no changes.    PT/OT/ST as indicated.    Prior records and hospital notes reviewed.    I agree with plan of care as detailed in CNP/PA note.    Fall precautions.    I discussed case with nursing staff.    Advised close PCP fu as outpatient as indicated.    Abdirashid Mares DO    Trainee role: Resident    I saw and evaluated the patient. I personally obtained the key and critical portions of the history and physical exam or was physically present for key and critical portions performed by the trainee. I reviewed the trainee's documentation and discussed the patient with the trainee. I agree with the trainee's medical decision making as documented on the trainee's notes.    Jaime Combs M.D.

## 2024-02-21 ENCOUNTER — HOME VISIT (OUTPATIENT)
Dept: PRIMARY CARE | Facility: CLINIC | Age: 84
End: 2024-02-21
Payer: MEDICARE

## 2024-02-21 DIAGNOSIS — I10 PRIMARY HYPERTENSION: ICD-10-CM

## 2024-02-21 DIAGNOSIS — F01.50 VASCULAR DEMENTIA WITHOUT BEHAVIORAL DISTURBANCE, PSYCHOTIC DISTURBANCE, MOOD DISTURBANCE, OR ANXIETY, UNSPECIFIED DEMENTIA SEVERITY (MULTI): Primary | ICD-10-CM

## 2024-02-21 DIAGNOSIS — S22.080D CLOSED WEDGE COMPRESSION FRACTURE OF T11 VERTEBRA WITH ROUTINE HEALING, SUBSEQUENT ENCOUNTER: ICD-10-CM

## 2024-02-21 DIAGNOSIS — K21.9 GASTROESOPHAGEAL REFLUX DISEASE, UNSPECIFIED WHETHER ESOPHAGITIS PRESENT: ICD-10-CM

## 2024-02-21 DIAGNOSIS — E78.2 MIXED HYPERLIPIDEMIA: ICD-10-CM

## 2024-02-21 PROCEDURE — 99349 HOME/RES VST EST MOD MDM 40: CPT | Performed by: STUDENT IN AN ORGANIZED HEALTH CARE EDUCATION/TRAINING PROGRAM

## 2024-04-24 ENCOUNTER — HOME VISIT (OUTPATIENT)
Dept: PRIMARY CARE | Facility: CLINIC | Age: 84
End: 2024-04-24
Payer: MEDICARE

## 2024-04-24 DIAGNOSIS — F01.50 VASCULAR DEMENTIA WITHOUT BEHAVIORAL DISTURBANCE, PSYCHOTIC DISTURBANCE, MOOD DISTURBANCE, OR ANXIETY, UNSPECIFIED DEMENTIA SEVERITY (MULTI): Primary | ICD-10-CM

## 2024-04-24 DIAGNOSIS — M79.89 SWELLING OF BOTH LOWER EXTREMITIES: ICD-10-CM

## 2024-04-24 DIAGNOSIS — I10 PRIMARY HYPERTENSION: ICD-10-CM

## 2024-04-24 PROCEDURE — 99348 HOME/RES VST EST LOW MDM 30: CPT | Performed by: STUDENT IN AN ORGANIZED HEALTH CARE EDUCATION/TRAINING PROGRAM

## 2024-04-25 PROBLEM — Z87.898 HX OF SYNCOPE: Status: RESOLVED | Noted: 2024-01-15 | Resolved: 2024-04-25

## 2024-04-25 PROBLEM — N39.0 UTI (URINARY TRACT INFECTION): Status: RESOLVED | Noted: 2024-01-15 | Resolved: 2024-04-25

## 2024-04-25 PROBLEM — Z85.3 HX: BREAST CANCER: Status: RESOLVED | Noted: 2024-01-15 | Resolved: 2024-04-25

## 2024-04-25 ASSESSMENT — PATIENT HEALTH QUESTIONNAIRE - PHQ9
SUM OF ALL RESPONSES TO PHQ9 QUESTIONS 1 AND 2: 0
2. FEELING DOWN, DEPRESSED OR HOPELESS: NOT AT ALL
1. LITTLE INTEREST OR PLEASURE IN DOING THINGS: NOT AT ALL

## 2024-04-25 ASSESSMENT — ENCOUNTER SYMPTOMS
OCCASIONAL FEELINGS OF UNSTEADINESS: 1
DEPRESSION: 0
LOSS OF SENSATION IN FEET: 0

## 2024-04-25 NOTE — PATIENT INSTRUCTIONS

## 2024-04-25 NOTE — PROGRESS NOTES
Subjective   Patient ID: Yasemin Sweet is a 84 y.o. female who presents for Initial Evaluation/new patient visit.  HPI  Yasemin recently moved to [facility], seen and evaluated in her room.    She is doing well overall. She was at skilled nursing for recent vertebral fracture, progressed well with therapy. She does report some lower extremity swelling since arrival at [facility]. She attributes this to likely increased salt in her diet. She denies any pain. She has been trying to elevate her legs while seated. No other complaints or concerns at this time. Discussed with nursing staff, who was present during examination, they have no other concerns at this time.    Review of Systems  12-point ROS was reviewed and is negative, unless otherwise noted in HPI    Objective   There were no vitals filed for this visit.   Physical Exam  GEN: alert, conversant  HEENT: PERRL, EOMI, MMM  NECK: supple, no LAD appreciated  CHEST: CTAB  CV: S1, S2, RRR, no murmurs appreciated  ABD: soft, NT, ND  EXT: 1+ pitting edema bilaterally to lower shin  SKIN: warm, dry    Assessment/Plan   #vascular dementia without behavioral disturbances  #hx of mechanical fall  #closed fracture of t11 vetrabrae   #HTN  #HLD  #GERD  #lower extremity swelling, bilaterally  Plan:  - advised compression stockings, elevation when able, low salt diet, add lasix 20mg daily PRN   - continue same medications, reviewed recent available records from SNF   - quarterly blood work  - ambulates with walker/assistive devices       Franky Venegas, DO

## 2024-04-25 NOTE — PROGRESS NOTES
Subjective   Patient ID: Yasemin Sweet is a 84 y.o. female who presents for Initial Evaluation/new patient visit.  HPI  Yasemin recently moved to [facility], seen and evaluated in the common room at [facility].    She is doing well overall. Bilateral lower extremity edema is much improved with the addition of lasix PRN. She is wearing compression stockings. She denies any complaint, she is eating well, sleeping well and feeling well overall.     Review of Systems  12-point ROS was reviewed and is negative, unless otherwise noted in HPI    Objective   There were no vitals filed for this visit.   Physical Exam  GEN: alert, conversant  HEENT: PERRL, EOMI, MMM  NECK: supple, no LAD appreciated  CHEST: CTAB  CV: S1, S2, RRR, no murmurs appreciated  ABD: soft, NT, ND  EXT: compression stockings in place, trace pedal edema  SKIN: warm, dry    Assessment/Plan   #vascular dementia without behavioral disturbances  #hx of mechanical fall  #closed fracture of t11 vetrabrae   #HTN  #HLD  #GERD  #lower extremity swelling, bilaterally  Patient was identified as a fall risk. Risk prevention instructions provided.  Plan:  - continue compression stockings, elevation, low salt diet, continue lasix 20mg daily PRN   - continue same medications, reviewed recent available records from SNF   - quarterly blood work  - ambulates with walker/assistive devices     Franky Venegas, DO

## 2024-05-20 DIAGNOSIS — R51.9 ACUTE NONINTRACTABLE HEADACHE, UNSPECIFIED HEADACHE TYPE: Primary | ICD-10-CM

## 2024-05-20 NOTE — PROGRESS NOTES
Patient complaining of more frequent, more severe headaches associated with visual changes including blurred and double vision in the right eye. Recent eval by ophthalmology without any noted issue. Obtain CT Head non-contrast for further evaluation.

## 2024-05-29 ENCOUNTER — HOSPITAL ENCOUNTER (OUTPATIENT)
Dept: RADIOLOGY | Facility: HOSPITAL | Age: 84
Discharge: HOME | End: 2024-05-29
Payer: MEDICARE

## 2024-05-29 DIAGNOSIS — R51.9 ACUTE NONINTRACTABLE HEADACHE, UNSPECIFIED HEADACHE TYPE: ICD-10-CM

## 2024-05-29 PROCEDURE — 70450 CT HEAD/BRAIN W/O DYE: CPT

## 2024-05-29 PROCEDURE — 70450 CT HEAD/BRAIN W/O DYE: CPT | Performed by: RADIOLOGY

## 2024-06-13 ENCOUNTER — TELEPHONE (OUTPATIENT)
Dept: INTERNAL MEDICINE | Facility: HOSPITAL | Age: 84
End: 2024-06-13
Payer: MEDICARE

## 2024-06-13 NOTE — TELEPHONE ENCOUNTER
Called family to discuss headaches. Reassuring CT findings. Recent eye exam with established Ophthalmologist, wearing appropriate glasses. Family in agreement to continue Tylenol PRN for headaches.

## 2025-05-27 ENCOUNTER — APPOINTMENT (OUTPATIENT)
Dept: RADIOLOGY | Facility: HOSPITAL | Age: 85
End: 2025-05-27
Payer: MEDICARE

## 2025-05-27 ENCOUNTER — HOSPITAL ENCOUNTER (EMERGENCY)
Facility: HOSPITAL | Age: 85
Discharge: HOME | End: 2025-05-28
Attending: EMERGENCY MEDICINE
Payer: MEDICARE
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
## 2025-05-27 DIAGNOSIS — S69.92XA INJURY OF LEFT WRIST, INITIAL ENCOUNTER: ICD-10-CM

## 2025-05-27 DIAGNOSIS — S09.90XA INJURY OF HEAD, INITIAL ENCOUNTER: ICD-10-CM

## 2025-05-27 DIAGNOSIS — S22.030A CLOSED WEDGE COMPRESSION FRACTURE OF T3 VERTEBRA, INITIAL ENCOUNTER: ICD-10-CM

## 2025-05-27 LAB
ABO GROUP (TYPE) IN BLOOD: NORMAL
ABO GROUP (TYPE) IN BLOOD: NORMAL
ALBUMIN SERPL BCP-MCNC: 4.4 G/DL (ref 3.4–5)
ALP SERPL-CCNC: 81 U/L (ref 33–136)
ALT SERPL W P-5'-P-CCNC: 10 U/L (ref 7–45)
ANION GAP SERPL CALC-SCNC: 15 MMOL/L (ref 10–20)
ANTIBODY SCREEN: NORMAL
AST SERPL W P-5'-P-CCNC: 15 U/L (ref 9–39)
BASOPHILS # BLD AUTO: 0.06 X10*3/UL (ref 0–0.1)
BASOPHILS NFR BLD AUTO: 0.7 %
BILIRUB SERPL-MCNC: 0.4 MG/DL (ref 0–1.2)
BUN SERPL-MCNC: 31 MG/DL (ref 6–23)
CALCIUM SERPL-MCNC: 10 MG/DL (ref 8.6–10.3)
CHLORIDE SERPL-SCNC: 103 MMOL/L (ref 98–107)
CO2 SERPL-SCNC: 26 MMOL/L (ref 21–32)
CREAT SERPL-MCNC: 1.58 MG/DL (ref 0.5–1.05)
EGFRCR SERPLBLD CKD-EPI 2021: 32 ML/MIN/1.73M*2
EOSINOPHIL # BLD AUTO: 0.22 X10*3/UL (ref 0–0.4)
EOSINOPHIL NFR BLD AUTO: 2.7 %
ERYTHROCYTE [DISTWIDTH] IN BLOOD BY AUTOMATED COUNT: 13.3 % (ref 11.5–14.5)
ETHANOL SERPL-MCNC: <10 MG/DL
GLUCOSE SERPL-MCNC: 90 MG/DL (ref 74–99)
HCT VFR BLD AUTO: 37.3 % (ref 36–46)
HGB BLD-MCNC: 12.2 G/DL (ref 12–16)
IMM GRANULOCYTES # BLD AUTO: 0.04 X10*3/UL (ref 0–0.5)
IMM GRANULOCYTES NFR BLD AUTO: 0.5 % (ref 0–0.9)
INR PPP: 1 (ref 0.9–1.1)
LACTATE SERPL-SCNC: 1.4 MMOL/L (ref 0.4–2)
LYMPHOCYTES # BLD AUTO: 2.37 X10*3/UL (ref 0.8–3)
LYMPHOCYTES NFR BLD AUTO: 29.1 %
MCH RBC QN AUTO: 28.7 PG (ref 26–34)
MCHC RBC AUTO-ENTMCNC: 32.7 G/DL (ref 32–36)
MCV RBC AUTO: 88 FL (ref 80–100)
MONOCYTES # BLD AUTO: 0.93 X10*3/UL (ref 0.05–0.8)
MONOCYTES NFR BLD AUTO: 11.4 %
NEUTROPHILS # BLD AUTO: 4.53 X10*3/UL (ref 1.6–5.5)
NEUTROPHILS NFR BLD AUTO: 55.6 %
NRBC BLD-RTO: 0 /100 WBCS (ref 0–0)
PLATELET # BLD AUTO: 314 X10*3/UL (ref 150–450)
POTASSIUM SERPL-SCNC: 3.9 MMOL/L (ref 3.5–5.3)
PROT SERPL-MCNC: 7.4 G/DL (ref 6.4–8.2)
PROTHROMBIN TIME: 11 SECONDS (ref 9.8–12.4)
RBC # BLD AUTO: 4.25 X10*6/UL (ref 4–5.2)
RH FACTOR (ANTIGEN D): NORMAL
RH FACTOR (ANTIGEN D): NORMAL
SODIUM SERPL-SCNC: 140 MMOL/L (ref 136–145)
WBC # BLD AUTO: 8.2 X10*3/UL (ref 4.4–11.3)

## 2025-05-27 PROCEDURE — 99291 CRITICAL CARE FIRST HOUR: CPT | Performed by: EMERGENCY MEDICINE

## 2025-05-27 PROCEDURE — 36415 COLL VENOUS BLD VENIPUNCTURE: CPT | Performed by: EMERGENCY MEDICINE

## 2025-05-27 PROCEDURE — 82077 ASSAY SPEC XCP UR&BREATH IA: CPT | Performed by: EMERGENCY MEDICINE

## 2025-05-27 PROCEDURE — 80053 COMPREHEN METABOLIC PANEL: CPT | Performed by: EMERGENCY MEDICINE

## 2025-05-27 PROCEDURE — 73502 X-RAY EXAM HIP UNI 2-3 VIEWS: CPT | Mod: LT

## 2025-05-27 PROCEDURE — 74176 CT ABD & PELVIS W/O CONTRAST: CPT

## 2025-05-27 PROCEDURE — 83605 ASSAY OF LACTIC ACID: CPT | Performed by: EMERGENCY MEDICINE

## 2025-05-27 PROCEDURE — 70450 CT HEAD/BRAIN W/O DYE: CPT

## 2025-05-27 PROCEDURE — 73110 X-RAY EXAM OF WRIST: CPT | Mod: LT

## 2025-05-27 PROCEDURE — 85025 COMPLETE CBC W/AUTO DIFF WBC: CPT | Performed by: EMERGENCY MEDICINE

## 2025-05-27 PROCEDURE — 73560 X-RAY EXAM OF KNEE 1 OR 2: CPT | Mod: LT

## 2025-05-27 PROCEDURE — 85610 PROTHROMBIN TIME: CPT | Performed by: EMERGENCY MEDICINE

## 2025-05-27 PROCEDURE — 70450 CT HEAD/BRAIN W/O DYE: CPT | Performed by: STUDENT IN AN ORGANIZED HEALTH CARE EDUCATION/TRAINING PROGRAM

## 2025-05-27 PROCEDURE — 2500000001 HC RX 250 WO HCPCS SELF ADMINISTERED DRUGS (ALT 637 FOR MEDICARE OP): Performed by: EMERGENCY MEDICINE

## 2025-05-27 PROCEDURE — 72125 CT NECK SPINE W/O DYE: CPT

## 2025-05-27 PROCEDURE — 73110 X-RAY EXAM OF WRIST: CPT | Mod: LEFT SIDE | Performed by: RADIOLOGY

## 2025-05-27 PROCEDURE — 86901 BLOOD TYPING SEROLOGIC RH(D): CPT | Performed by: EMERGENCY MEDICINE

## 2025-05-27 PROCEDURE — 99285 EMERGENCY DEPT VISIT HI MDM: CPT | Mod: 25

## 2025-05-27 PROCEDURE — G0390 TRAUMA RESPONS W/HOSP CRITI: HCPCS

## 2025-05-27 PROCEDURE — 72125 CT NECK SPINE W/O DYE: CPT | Performed by: STUDENT IN AN ORGANIZED HEALTH CARE EDUCATION/TRAINING PROGRAM

## 2025-05-27 RX ORDER — HYDROCODONE BITARTRATE AND ACETAMINOPHEN 5; 325 MG/1; MG/1
1 TABLET ORAL ONCE
Refills: 0 | Status: COMPLETED | OUTPATIENT
Start: 2025-05-27 | End: 2025-05-27

## 2025-05-27 RX ORDER — HYDROCODONE BITARTRATE AND ACETAMINOPHEN 5; 325 MG/1; MG/1
1 TABLET ORAL EVERY 4 HOURS PRN
Qty: 8 TABLET | Refills: 0 | Status: SHIPPED | OUTPATIENT
Start: 2025-05-27 | End: 2025-05-28

## 2025-05-27 RX ORDER — ACETAMINOPHEN 325 MG/1
975 TABLET ORAL ONCE
Status: COMPLETED | OUTPATIENT
Start: 2025-05-27 | End: 2025-05-27

## 2025-05-27 RX ADMIN — HYDROCODONE BITARTRATE AND ACETAMINOPHEN 1 TABLET: 5; 325 TABLET ORAL at 21:44

## 2025-05-27 RX ADMIN — ACETAMINOPHEN 975 MG: 325 TABLET ORAL at 20:29

## 2025-05-27 ASSESSMENT — PAIN SCALES - PAIN ASSESSMENT IN ADVANCED DEMENTIA (PAINAD)
BODYLANGUAGE: TENSE, DISTRESSED PACING, FIDGETING
BREATHING: NORMAL
CONSOLABILITY: DISTRACTED OR REASSURED BY VOICE/TOUCH
FACIALEXPRESSION: SAD, FRIGHTENED, FROWN
TOTALSCORE: 5
NEGVOCALIZATION: REPEATED TROUBLED CALLING OUT, LOUD MOANING/GROANING, CRYING

## 2025-05-27 ASSESSMENT — LIFESTYLE VARIABLES
TOTAL SCORE: 0
HAVE PEOPLE ANNOYED YOU BY CRITICIZING YOUR DRINKING: NO
EVER HAD A DRINK FIRST THING IN THE MORNING TO STEADY YOUR NERVES TO GET RID OF A HANGOVER: NO
HAVE YOU EVER FELT YOU SHOULD CUT DOWN ON YOUR DRINKING: NO
EVER FELT BAD OR GUILTY ABOUT YOUR DRINKING: NO

## 2025-05-27 ASSESSMENT — PAIN - FUNCTIONAL ASSESSMENT
PAIN_FUNCTIONAL_ASSESSMENT: PAINAD (PAIN ASSESSMENT IN ADVANCED DEMENTIA SCALE)
PAIN_FUNCTIONAL_ASSESSMENT: 0-10

## 2025-05-27 ASSESSMENT — PAIN SCALES - GENERAL: PAINLEVEL_OUTOF10: 5 - MODERATE PAIN

## 2025-05-27 NOTE — ED TRIAGE NOTES
Pt from [facility] presents to er for complaints of an unwitnessed fall c/o left wrist and head pain. Pt is A&Ox1-2 at baseline due to dementia. Pt ambulated at scene. No other complaints at thi time.

## 2025-05-28 VITALS
DIASTOLIC BLOOD PRESSURE: 73 MMHG | BODY MASS INDEX: 20.4 KG/M2 | OXYGEN SATURATION: 98 % | RESPIRATION RATE: 17 BRPM | HEART RATE: 86 BPM | WEIGHT: 130 LBS | SYSTOLIC BLOOD PRESSURE: 149 MMHG | HEIGHT: 67 IN | TEMPERATURE: 97.9 F

## 2025-05-28 RX ORDER — HYDROCODONE BITARTRATE AND ACETAMINOPHEN 5; 325 MG/1; MG/1
1 TABLET ORAL EVERY 6 HOURS PRN
Qty: 8 TABLET | Refills: 0 | Status: SHIPPED | OUTPATIENT
Start: 2025-05-28

## 2025-05-28 NOTE — ED PROCEDURE NOTE
Procedure  Orthopaedic Injury Treatment - Upper Extremity    Performed by: Juany Gandhi MD  Authorized by: Juany Gandhi MD    Consent:     Consent obtained:  Verbal    Risks discussed:  Fracture, irreducible dislocation, recurrent dislocation, nerve damage, restricted joint movement, stiffness and vascular damage  Universal protocol:     Imaging studies available: yes      Patient identity confirmed:  Verbally with patient, arm band and hospital-assigned identification number  Location:     Location:  Wrist    Wrist location:  L wrist  Pre-procedure details:     Pre-procedure imaging:  X-ray    Imaging findings: no joint dislocation      Distal perfusion: normal    Sedation:     Sedation type:  None  Anesthesia:     Anesthesia method:  None  Procedure details:     Manipulation performed: no      Immobilization:  Splint    Splint type: velcro.  Post-procedure details:     Neurological function: normal      Distal perfusion: normal      Range of motion: normal      Procedure completion:  Tolerated  Comments:      Velcro wrist splint placed by nursing. Pt NVI before and after splint placement               Juany Gandhi MD  05/27/25 [id number]

## 2025-05-28 NOTE — ED PROVIDER NOTES
HPI   Chief Complaint   Patient presents with    Fall    Head Injury    Wrist Pain       HPI: 85-year-old female history of dementia sent [facility] for [facility].  Patient is unable to provide much other history as she is tells me she was walking from the living room to the kitchen.  She is complaining of the left wrist and knee pain no blood thinners were noted.    Family HX: Denies any significant/pertinent family history.  Social Hx: Denies ETOH or drug use.  Review of systems:  Gen.: No weight loss, fatigue, anorexia, insomnia, fever.   Eyes: No vision loss, double vision, drainage, eye pain.   ENT: No pharyngitis, dry mouth.   Cardiac: No chest pain, palpitations, syncope, near syncope.   Pulmonary: No shortness of breath, cough, hemoptysis.   Heme/lymph: No swollen glands, fever, bleeding.   GI: No abdominal pain, change in bowel habits, melena, hematemesis, hematochezia, nausea, vomiting, diarrhea.   : No discharge, dysuria, frequency, urgency, hematuria.   Musculoskeletal: No limb pain, joint pain, joint swelling.   Skin: No rashes.   Psych: No depression, anxiety, suicidality, homicidality.   Review of systems is otherwise negative unless stated above or in history of present illness.    Physical Exam:    Appearance: Alert, oriented , cooperative,  in no acute distress. Well nourished & well hydrated.    Skin: Intact,  dry skin, no lesions, rash, petechiae or purpura.     Eyes: PERRLA, EOMs intact,  Conjunctiva pink with no redness or exudates. Eyelids without lesions. No scleral icterus.     ENT: Hearing grossly intact. External auditory canals patent, tympanic membranes intact with visible landmarks. Nares patent, mucus membranes moist. Dentition without lesions. Pharynx clear, uvula midline.     Neck: Supple, without meningismus. Thyroid not palpable. Trachea at midline. No lymphadenopathy.    Pulmonary: Clear bilaterally with good chest wall excursion. No rales, rhonchi or wheezing. No  accessory muscle use or stridor.    Cardiac: Normal S1, S2 without murmur, rub, gallop or extrasystole. No JVD, Carotids without bruits.    Abdomen: Soft, nontender, active bowel sounds.  No palpable organomegaly.  No rebound or guarding.  No CVA tenderness.    Genitourinary: Exam deferred.    Musculoskeletal: Full range of motion. no pain, edema, or deformity. Pulses full and equal. No cyanosis, clubbing, or edema.  Left wrist without obvious ecchymosis or edema.  No deformity.  Left knee without obvious ecchymosis or edema.  No deformity.  Full range of motion  Neurological:  Cranial nerves II through XII are grossly intact, finger-nose touch is normal, normal sensation, no weakness, no focal findings identified.    Psychiatric: Appropriate mood and affect.     Medical Decision-Making:  Testing: Patient was a limited trauma activation.  She did go emergently to the CT scanner.  She is of the head and C-spine were obtained and read by radiology as no acute intracranial abnormality borderline widening of C6-7 disc space anteriorly could be degenerative or related to a more likely remote hyperextension injury.  MRI would be better evaluate.  She is not having any neck pain.  She is not having any neurological symptoms.  CT chest shows borderline cardiomegaly with no pleural effusion.  Lungs are clear no obvious fractures.  No rib fractures.  No fractures to the abdomen or pelvis.  LS spine shows nonobstructing calculi compression fractures that appear to be old.  Patient is not having any back pain.  She is neurovascularly intact.  X-rays of the knee were read by radiology as no acute bony abnormality of the x-ray examination of the knee mild soft tissue swelling.  X-rays of the wrist show a possible nondisplaced triquetral fracture.  I did speak to orthopedics on-call Dr. Lin regarding this finding.  He is recommending that we place the patient in a Velcro cock up wrist splint and have her follow-up with hand  surgery Dr. Mathis.  I did send a prescription for Norco with sedation precautions with the patient and she will be discharged back to the nursing home.  Treatment:   Reevaluation:   Plan: Homegoing. Discussed differential. Will follow-up with the primary physician in the next 2-3 days. Return if worse. They understand return precautions and discharge instructions. Patient and family/friend/caregiver are in agreement with this plan.   Impression:   1.  Fall  2.  Possible triquetral fracture  3.   compression fractures likely old    Labs Reviewed  CBC WITH AUTO DIFFERENTIAL - Abnormal     WBC                           8.2                    nRBC                          0.0                    RBC                           4.25                   Hemoglobin                    12.2                   Hematocrit                    37.3                   MCV                           88                     MCH                           28.7                   MCHC                          32.7                   RDW                           13.3                   Platelets                     314                    Neutrophils %                 55.6                   Immature Granulocytes %, Automated   0.5                    Lymphocytes %                 29.1                   Monocytes %                   11.4                   Eosinophils %                 2.7                    Basophils %                   0.7                    Neutrophils Absolute          4.53                   Immature Granulocytes Absolute, Au*   0.04                   Lymphocytes Absolute          2.37                   Monocytes Absolute            0.93 (*)               Eosinophils Absolute          0.22                   Basophils Absolute            0.06                LACTATE - Normal     Lactate                       1.4                      Narrative: Venipuncture immediately after or during the administration of Metamizole may lead to  falsely low results. Testing should be performed immediately prior to Metamizole dosing.  PROTIME-INR - Normal     Protime                       11.0                   INR                           1.0                 COMPREHENSIVE METABOLIC PANEL  ALCOHOL  TYPE AND SCREEN  VERAB/VERIFY ABORH     CT head W O contrast trauma protocol   Final Result    CT HEAD:    1. No acute intracranial abnormality or calvarial fracture.                      CT CERVICAL SPINE:    1. Borderline widening of the C6-C7 disc space anteriorly that could    be degenerative or related to a more likely remote hyperextension    injury given the absence of prevertebral edema. MRI would better    evaluate if deemed clinically warranted.    2. Age-indeterminate compression deformity of the upper T3 endplate    with minimal height loss. MRI would also best evaluate for acuity.                MACRO:    None.          Signed by: Jeramie Alejo 5/27/2025 8:32 PM    Dictation workstation:   [id number]     CT cervical spine wo IV contrast   Final Result    CT HEAD:    1. No acute intracranial abnormality or calvarial fracture.                      CT CERVICAL SPINE:    1. Borderline widening of the C6-C7 disc space anteriorly that could    be degenerative or related to a more likely remote hyperextension    injury given the absence of prevertebral edema. MRI would better    evaluate if deemed clinically warranted.    2. Age-indeterminate compression deformity of the upper T3 endplate    with minimal height loss. MRI would also best evaluate for acuity.                MACRO:    None.          Signed by: Jeramie Alejo 5/27/2025 8:32 PM    Dictation workstation:   [id number]     XR wrist left 3+ views   Final Result    1.Small bony density adjacent to the triquetrum. The possibility    of a small triquetral fracture of indeterminate age is considered.     Follow-up study or CT could be considered as indicated.    2.Moderate degenerative joint  disease and chondrocalcinosis.    Signed by Kellee Antunez DO     CT chest abdomen pelvis wo IV contrast    (Results Pending)  CT lumbar spine retrospective reconstruction protocol    (Results Pending)  CT thoracic spine retrospective reconstruction protocol    (Results Pending)   XR knee left 1-2 views    (Results Pending)         History provided by:  EMS personnel  History limited by:  Dementia          Patient History   Medical History[1]  Surgical History[2]  Family History[3]  Social History[4]    Physical Exam   ED Triage Vitals   Temperature Heart Rate Respirations BP   05/27/25 1949 05/27/25 1949 05/27/25 1949 05/27/25 1949   36.6 °C (97.9 °F) 66 16 121/58      Pulse Ox Temp Source Heart Rate Source Patient Position   05/27/25 1949 05/27/25 1949 05/27/25 1949 05/27/25 1949   99 % Skin Monitor Lying      BP Location FiO2 (%)     05/27/25 1949 05/27/25 2015     Right arm 21 %       Physical Exam      ED Course & MDM   Diagnoses as of 05/27/25 2145   Fall, initial encounter   Injury of head, initial encounter   Injury of left wrist, initial encounter   Closed wedge compression fracture of T3 vertebra, initial encounter                 No data recorded     [place] Coma Scale Score: 14 (05/27/25 2018 : Kaveh Perez RN)                           Medical Decision Making      Procedure  Procedures       [1]   Past Medical History:  Diagnosis Date    HX: breast cancer 01/15/2024    UTI (urinary tract infection) 01/15/2024   [2] No past surgical history on file.  [3] No family history on file.  [4]   Social History  Tobacco Use    Smoking status: Never    Smokeless tobacco: Not on file   Substance Use Topics    Alcohol use: Never    Drug use: Never        Juany Gandhi MD  05/27/25 2145

## 2025-05-28 NOTE — ED PROCEDURE NOTE
Procedure  Critical Care    Performed by: Juany Gandhi MD  Authorized by: Juany Gandhi MD    Critical care provider statement:     Critical care time (minutes):  36    Critical care time was exclusive of:  Separately billable procedures and treating other patients    Critical care was necessary to treat or prevent imminent or life-threatening deterioration of the following conditions:  Trauma    Critical care was time spent personally by me on the following activities:  Ordering and performing treatments and interventions, ordering and review of laboratory studies, ordering and review of radiographic studies, pulse oximetry, re-evaluation of patient's condition, evaluation of patient's response to treatment, examination of patient and discussions with consultants               Juany Gandhi MD  05/27/25 4345